# Patient Record
Sex: FEMALE | Race: WHITE | NOT HISPANIC OR LATINO | Employment: OTHER | URBAN - METROPOLITAN AREA
[De-identification: names, ages, dates, MRNs, and addresses within clinical notes are randomized per-mention and may not be internally consistent; named-entity substitution may affect disease eponyms.]

---

## 2018-09-21 ENCOUNTER — RECORDS - HEALTHEAST (OUTPATIENT)
Dept: LAB | Facility: CLINIC | Age: 83
End: 2018-09-21

## 2018-09-24 LAB
BACTERIA SPEC CULT: NO GROWTH
BACTERIA SPEC CULT: NORMAL
GRAM STAIN RESULT: NORMAL
GRAM STAIN RESULT: NORMAL

## 2021-05-26 ENCOUNTER — RECORDS - HEALTHEAST (OUTPATIENT)
Dept: ADMINISTRATIVE | Facility: CLINIC | Age: 86
End: 2021-05-26

## 2021-07-19 ENCOUNTER — TRANSCRIBE ORDERS (OUTPATIENT)
Dept: VASCULAR SURGERY | Facility: CLINIC | Age: 86
End: 2021-07-19

## 2021-07-19 DIAGNOSIS — R60.0 BILATERAL LEG EDEMA: Primary | ICD-10-CM

## 2021-07-19 DIAGNOSIS — I83.93 VARICOSE VEINS OF BOTH LOWER EXTREMITIES: ICD-10-CM

## 2021-09-15 ENCOUNTER — ANCILLARY PROCEDURE (OUTPATIENT)
Dept: VASCULAR ULTRASOUND | Facility: CLINIC | Age: 86
End: 2021-09-15
Attending: SPECIALIST
Payer: COMMERCIAL

## 2021-09-15 ENCOUNTER — OFFICE VISIT (OUTPATIENT)
Dept: VASCULAR SURGERY | Facility: CLINIC | Age: 86
End: 2021-09-15
Attending: INTERNAL MEDICINE
Payer: COMMERCIAL

## 2021-09-15 VITALS — DIASTOLIC BLOOD PRESSURE: 80 MMHG | HEART RATE: 60 BPM | SYSTOLIC BLOOD PRESSURE: 132 MMHG | TEMPERATURE: 98.5 F

## 2021-09-15 DIAGNOSIS — I83.93 VARICOSE VEINS OF BOTH LOWER EXTREMITIES: ICD-10-CM

## 2021-09-15 DIAGNOSIS — R60.0 BILATERAL LEG EDEMA: ICD-10-CM

## 2021-09-15 DIAGNOSIS — I83.893 SYMPTOMATIC VARICOSE VEINS OF BOTH LOWER EXTREMITIES: Primary | ICD-10-CM

## 2021-09-15 PROBLEM — K43.0 INCISIONAL HERNIA WITH OBSTRUCTION BUT NO GANGRENE: Status: ACTIVE | Noted: 2018-01-29

## 2021-09-15 PROBLEM — I35.0 AORTIC STENOSIS: Status: ACTIVE | Noted: 2017-07-05

## 2021-09-15 PROBLEM — M19.041 PRIMARY OSTEOARTHRITIS OF BOTH HANDS: Status: ACTIVE | Noted: 2017-04-25

## 2021-09-15 PROBLEM — H16.223 KERATITIS SICCA, BILATERAL: Status: ACTIVE | Noted: 2021-04-08

## 2021-09-15 PROBLEM — N18.30 CKD (CHRONIC KIDNEY DISEASE) STAGE 3, GFR 30-59 ML/MIN (H): Status: ACTIVE | Noted: 2017-04-25

## 2021-09-15 PROBLEM — K21.9 CHRONIC GERD: Status: ACTIVE | Noted: 2021-04-08

## 2021-09-15 PROBLEM — Z79.01 ANTICOAGULATION MONITORING, INR RANGE 2-3: Status: ACTIVE | Noted: 2018-01-03

## 2021-09-15 PROBLEM — G31.84 MCI (MILD COGNITIVE IMPAIRMENT): Status: ACTIVE | Noted: 2021-09-01

## 2021-09-15 PROBLEM — Z71.89 ACP (ADVANCE CARE PLANNING): Status: ACTIVE | Noted: 2017-07-13

## 2021-09-15 PROBLEM — I48.20 CHRONIC ATRIAL FIBRILLATION (H): Status: ACTIVE | Noted: 2017-07-18

## 2021-09-15 PROBLEM — S22.41XD CLOSED FRACTURE OF MULTIPLE RIBS OF RIGHT SIDE WITH ROUTINE HEALING: Status: ACTIVE | Noted: 2021-09-01

## 2021-09-15 PROBLEM — R42 VERTIGO: Status: ACTIVE | Noted: 2017-07-16

## 2021-09-15 PROBLEM — K25.4 GASTRIC ULCER WITH HEMORRHAGE: Status: ACTIVE | Noted: 2017-09-15

## 2021-09-15 PROBLEM — M19.042 PRIMARY OSTEOARTHRITIS OF BOTH HANDS: Status: ACTIVE | Noted: 2017-04-25

## 2021-09-15 PROBLEM — S62.102D FRACTURE OF LEFT WRIST WITH ROUTINE HEALING: Status: ACTIVE | Noted: 2021-08-02

## 2021-09-15 PROBLEM — D62 ACUTE BLOOD LOSS ANEMIA: Status: ACTIVE | Noted: 2017-07-11

## 2021-09-15 PROBLEM — Z98.84 STATUS POST GASTRIC BYPASS FOR OBESITY: Status: ACTIVE | Noted: 2018-02-23

## 2021-09-15 PROBLEM — M17.12 OSTEOARTHRITIS OF LEFT KNEE: Status: ACTIVE | Noted: 2019-11-11
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
## 2021-09-15 PROBLEM — I27.20 PULMONARY HTN (H): Status: ACTIVE | Noted: 2017-07-05

## 2021-09-15 PROBLEM — E66.9 OBESITY: Status: ACTIVE | Noted: 2019-11-11

## 2021-09-15 PROBLEM — I87.2 VENOUS INSUFFICIENCY: Status: ACTIVE | Noted: 2017-06-26

## 2021-09-15 PROCEDURE — 93970 EXTREMITY STUDY: CPT

## 2021-09-15 PROCEDURE — 99203 OFFICE O/P NEW LOW 30 MIN: CPT | Performed by: SPECIALIST

## 2021-09-15 PROCEDURE — 93970 EXTREMITY STUDY: CPT | Mod: 26 | Performed by: SURGERY

## 2021-09-15 PROCEDURE — G0463 HOSPITAL OUTPT CLINIC VISIT: HCPCS | Mod: 25

## 2021-09-15 RX ORDER — POTASSIUM CHLORIDE 750 MG/1
10 TABLET, EXTENDED RELEASE ORAL
COMMUNITY
Start: 2021-07-12

## 2021-09-15 RX ORDER — FUROSEMIDE 20 MG
20 TABLET ORAL
COMMUNITY
Start: 2021-05-24

## 2021-09-15 RX ORDER — NYSTATIN 100000 U/G
CREAM TOPICAL
COMMUNITY
Start: 2020-11-16

## 2021-09-15 RX ORDER — CLOBETASOL PROPIONATE 0.5 MG/G
OINTMENT TOPICAL
COMMUNITY
Start: 2020-11-16 | End: 2021-09-15

## 2021-09-15 RX ORDER — FEBUXOSTAT 40 MG/1
40 TABLET, FILM COATED ORAL
COMMUNITY
Start: 2021-01-21

## 2021-09-15 RX ORDER — METOPROLOL TARTRATE 50 MG
TABLET ORAL
COMMUNITY
Start: 2021-09-03

## 2021-09-15 RX ORDER — LEVOTHYROXINE SODIUM 125 UG/1
125 TABLET ORAL
COMMUNITY
Start: 2021-07-07

## 2021-09-15 RX ORDER — TRAMADOL HYDROCHLORIDE 50 MG/1
50 TABLET ORAL
COMMUNITY
Start: 2021-08-28

## 2021-09-15 RX ORDER — WARFARIN SODIUM 2 MG/1
TABLET ORAL
COMMUNITY
Start: 2021-08-30

## 2021-09-15 RX ORDER — ACETAMINOPHEN 500 MG
1000 TABLET ORAL
COMMUNITY
Start: 2019-11-13

## 2021-09-15 NOTE — PATIENT INSTRUCTIONS
"We are prescribing some compression stockings for you. I have included different suppliers that should help you get measured and fitting to ensure proper fitting socks. You should wear these socks as much as you can. It is especially important to wear them with long periods of sitting/standing, long car rides or if you will be flying. Compression socks should get refilled every 4-6 months. They do not need to be worn at night while in bed.    If you do a lot of standing it is good to do calf raises to help keep the blood pumping. If you sit a lot at work it is good to get up periodically to walk around. Elevation of the foot of your bed 4-6\" helps the blood return back to where it is needed.    We would like you to follow up in 3 months after wearing socks to see how you are doing.    Varicose Veins  Varicose veins are swollen, enlarged veins most often found in the legs. They are usually blue or purple in color and may bulge, twist, and stand out under the skin.  Normally, veins return blood from the body to the heart. The leg veins have one-way valves that prevent blood from flowing backward in the vein. When the valves are weak or damaged, blood backs up in the veins. This may cause some of the veins to swell and bulge and become varicose veins.    Symptoms  Varicose veins may or may not cause symptoms. If symptoms do occur, they can include:    Legs that feel tired, achy, heavy, or itchy    Leg muscle cramps    Skin changes, such as discoloration, dryness, redness, or rash (in more severe cases, you may also have sores on the skin called venous leg ulcers)    Risk Factors  There are a number of factors that increase the risk for varicose veins. These can include:    Being a woman    Being older    Sitting or standing for long periods    Being overweight    Being pregnant    Having a family history of varicose veins  Treatment begins with simple self-help measures (see below). If these don't help, there are many " procedures that can be done to shrink or remove varicose veins. Your healthcare provider can tell you more about these options, if needed.    Home care    Support or compression stockings will likely be prescribed. If so, be sure to wear them as directed. They may help improve blood flow.    Exercising helps strengthen your leg muscles and improve blood flow. To get the most benefit, choose exercises such as walking, swimming, or cycling. Also try to exercise for at least 30 minutes on most days.    Raising (elevating) your legs lets gravity help blood flow back to the heart. Sit or lie with your feet above heart level a few times throughout the day, or as directed.    Avoid long periods of sitting or standing. Change positions often. Also, move your ankles, toes and knees often. This may also help improve blood flow.    If you are overweight, talk with your healthcare provider about setting up a weight-loss plan. Maintaining a healthy weight can help reduce the strain on your veins. It may also improve symptoms, such as swelling and aching.    If you have dryness and itching, ask your provider about special lotions that can be applied to the skin to help improve symptoms.    Follow-up care  Follow up with your healthcare provider, or as directed. If imaging tests were done, you'll be told the results and if there are any new findings that affect your care.    When to seek medical advice  Call your healthcare provider right away if any of these occur:    Sudden, severe leg swelling, pain, or redness    Symptoms worsen, or they don't improve with self-care    Bleeding from any affected veins    Ulcers form on the legs, ankles, or feet    Fever of 100.4 F (38 C) or higher, or as advised by your provider    Understanding Spider and Varicose Veins  Do you often hide your legs because of the way they look? You may have noticed tiny red or blue bursts (spider veins). Or maybe you have veins that bulge or look twisted  (varicose veins). If so, there are treatments that can help    What are the symptoms?  Spider veins or varicose veins may never be a problem. But sometimes they can cause legs to ache or swell. Your legs may also feel heavy and tired, or like they're burning. These symptoms may be more severe at the end of the day. Prolonged sitting or standing can also make your symptoms worse.    Who gets spider and varicose veins?  Anyone can get spider or varicose veins. But vein problems tend to be hereditary (run in families). Other factors that can affect veins include:    Pregnancy, hormones, and birth control pills    A job where you stand or sit a lot    Extra weight or lack of exercise    Age    What can be done?  Spider and varicose veins can affect the way you feel about yourself. Talk to your healthcare provider about your concerns. There are treatments that can ease symptoms and make your legs look better.    Your treatment choices  Treatment may include self-care, sclerotherapy (injecting veins with a chemical), surgery, or newer nonsurgical minimally invasive therapies. Spider veins and some varicose veins can be treated with sclerotherapy. Large varicose veins can often be treated with newer minimally invasive procedures and, in rare cases, surgery may be needed.     [facility] Locations:   Business Hours:  Monday - Friday 8am - 4:30pm  Visit [email or web address]     [facility]  [address], [address]  [place] [address]  [phone]  Monday - Friday, 8 a.m. to 4:30 p.m.     [facility]  [address], [address]  [place] [address]  [phone]  Monday - Friday, 8 a.m. to 4:30 p.m.        [facility]  [address] [place] [address], [address]  [place] [address]  [phone]  Monday - Friday, 8 a.m. to 4:30 p.m.     [facility]  [address]  [place] [address]  [phone]  Monday - Friday, 8 a.m. to 4:30 p.m.     [facility]  [address], [address]  [place] [address]  [phone]  Monday - Friday, 8 a.m. to 4:30 p.m.     [facility]  [address], [address]  [place] [address]  [phone]  Monday - Friday, 8 a.m. to 4:30 p.m.  *Closed daily Noon to 1 p.m. for patient deliveries     [facility]  [facility]  [address], [address]     [place] [address]            [phone]  Monday - Friday, 8 a.m. to 5:00 p.m.

## 2021-09-17 NOTE — PROGRESS NOTES
[facility] Vein Consult      Assessment:     1. varicose veins and spider veins, bilateral   2. leg pain and swelling , bilateral   3. Insuffiencey of bilateral greater saphenous vein,  right acessory saphenous vein    Plan:     1. Treatment options of conservative therapy of stockings use, exercise, weight loss, elevating legs when possible.    2. Script for compression stockings 20-30 mm hg  3. Ultrasound to evaluate legs for incompetency of both deep and superficial system .   4. Surgical treatment, discussed briefly today.  5. Follow up: 3 months.   6. Call for any questions concerns or issues    Subjective:      Jenn Martinez is a 86 year old female  who was referred by Doe Zarate  for evaluation of varicose veins. Symptoms include pain, aching, fatigue, burning, edema and dermatitis. Patient has history of leg selling, pain and vein issues that have progressed. Pain and symptoms have affected daily living and work activities needing medications. Here for evaluation today. no stocking or compression devic use    Allergies:Augmentin, Aspirin, Allopurinol, and Atorvastatin    History reviewed. No pertinent past medical history.    History reviewed. No pertinent surgical history.      Current Outpatient Medications:      acetaminophen (TYLENOL) 500 MG tablet, Take 1,000 mg by mouth, Disp: , Rfl:      febuxostat (ULORIC) 40 MG TABS tablet, Take 40 mg by mouth, Disp: , Rfl:      furosemide (LASIX) 20 MG tablet, Take 20 mg by mouth, Disp: , Rfl:      levothyroxine (SYNTHROID/LEVOTHROID) 125 MCG tablet, Take 125 mcg by mouth, Disp: , Rfl:      metoprolol tartrate (LOPRESSOR) 50 MG tablet, TAKE 1 TABLET BY MOUTH TWICE A DAY, Disp: , Rfl:      nystatin (MYCOSTATIN) 594313 UNIT/GM external cream, , Disp: , Rfl:      potassium chloride ER (K-TAB/KLOR-CON) 10 MEQ CR tablet, Take 10 mEq by mouth, Disp: , Rfl:      traMADol (ULTRAM) 50 MG tablet, Take 50 mg by mouth, Disp: , Rfl:      vitamin B-12  (CYANOCOBALAMIN) 1000 MCG tablet, Take 1,000 mcg by mouth, Disp: , Rfl:      warfarin ANTICOAGULANT (COUMADIN) 2 MG tablet, Take by mouth 2 mg (2 mg x 1) every day. Or as directed, Disp: , Rfl:      History reviewed. No pertinent family history.           Review of Systems:  Pertinent items are noted in HPI.   Patient has symptomatic veins and changes of bilateral legs. These have progressed to the point of causing symptoms on a daily basis. This causes issues with daily activities and chores such as washing dishes, vacuuming, outdoor upkeep and standing for long lengths of time       Objective:     Vitals:    09/15/21 1029   BP: 132/80   Pulse: 60   Temp: 98.5  F (36.9  C)     There is no height or weight on file to calculate BMI.    EXAM:  GENERAL: This is a well-developed 86 year old female who appears her stated age  HEAD: normocephalic  HEENT: Pupils equal and reactive bilaterally  MOUTH: mucus membranes intact. Normal dentation  CARDIAC: RRR without murmur  CHEST/LUNG:  Clear to auscultation bilaterally  ABDOMEN: Soft, nontender, nondistended, no masses noted   NEUROLOGIC: Focally intact, nonfocal, alert and oriented x 3  INTEGUMENT: No open lesions or ulcers  VASCULAR: Pulses intact, symmetrical upper and lower extremities. There areskin changes consistent with chronic venous insufficiency. Varicose veins present in bilateral greater saphenous distribution. Spider veins present bilateral.                        Imaging:    Narrative & Impression    BILATERAL Venous Insufficiency Ultrasound (Date: 09/15/21)    BILATERAL Lower Extremity          Indication:  Bilateral leg pain/swelling/varicose veins/edema     Previous: None     Patient History: Swelling, Stasis and Morbid Obesity     Presenting Symptoms:  Swelling, Varicose Veins, Pain and Stasis     Technique:   Supine and Upright Ultrasound of the Deep and Superficial Veins with Valsalva and Compression Augmentation Maneuvers. Duplex Imaging is performed  utilizing gray-scale, Two-dimensional images, color-flow imaging, Doppler waveform analysis, and Spectral doppler imaging done with provacative maneuvers.      Incompetency Criteria:  Deep vein reflux reported when greater than 1000 msec flow reversal. Superficial vein reflux reported when equal to or greater than 600 msec flow reversal.  vein reflux reported as greater than 350 msec flow reversal.      Right  Leg Deep Veins    CFV SFJ PFV PROX FV   PROX FV MID FV DIST POP V. PERON.   V. PTV'S   Compressibility  (FC,PC,NC) FC FC FC FC FC FC FC FC FC   Reflux +   + - - - -   -         Right Leg Saphenous Veins  Location SFJ PROX THIGH KNEE MID CALF SPJ PROX CALF MID CALF   RT GSV (mm) 10 9 7 6         Reflux + + + +         RT SSV (mm)         6 5 5   Reflux         - - -      Location SFJ PROX THIGH MID THIGH   RT AASV (mm) 7 6 6   Reflux + + +      Left Leg Deep Veins    CFV SFJ PFV PROX SFV PROX SFV MID SFV DIST POP V. PERON. V. PTV'S   Compressibility  (FC,PC,NC) FC FC FC FC FC FC FC FC FC   Reflux +   - - - - +   -         Lt Leg Saphenous Veins   Location SFJ PROX THIGH KNEE MID CALF SPJ PROX CALF MID CALF   LT GSV (mm) 10 8 6 5         Reflux + + + +         LT SSV (mm)         6 5 5   Reflux         - - -      Impression:          Reference:     Compressibility: FC= Fully compressible, PC= Partially compressible, NC= Non-compressible, NV= Not Visualized     Reflux: (+) Incompetent  (-) Competent, (NV) = Not Visualized     Interpretation criteria:          Duration of Retrograde flow (milliseconds)  Category Deep Veins Superficial Veins  veins   Competent < 1000ms < 600ms < 350ms   Incompetent > 1000ms > 600ms > 350ms                Ovi Rodriguez MD  General Surgery [phone]  Vascular Surgery [phone]

## 2022-04-26 ENCOUNTER — LAB REQUISITION (OUTPATIENT)
Dept: LAB | Facility: CLINIC | Age: 87
End: 2022-04-26
Payer: MEDICARE

## 2022-04-26 DIAGNOSIS — G30.9 ALZHEIMER'S DISEASE, UNSPECIFIED (CODE) (H): ICD-10-CM

## 2022-04-26 DIAGNOSIS — I10 ESSENTIAL (PRIMARY) HYPERTENSION: ICD-10-CM

## 2022-04-26 DIAGNOSIS — E03.9 HYPOTHYROIDISM, UNSPECIFIED: ICD-10-CM

## 2022-04-27 LAB
ANION GAP SERPL CALCULATED.3IONS-SCNC: 8 MMOL/L (ref 5–18)
BASOPHILS # BLD AUTO: 0.1 10E3/UL (ref 0–0.2)
BASOPHILS NFR BLD AUTO: 1 %
BUN SERPL-MCNC: 4 MG/DL (ref 8–28)
CALCIUM SERPL-MCNC: 9.5 MG/DL (ref 8.5–10.5)
CHLORIDE BLD-SCNC: 106 MMOL/L (ref 98–107)
CO2 SERPL-SCNC: 28 MMOL/L (ref 22–31)
CREAT SERPL-MCNC: 0.71 MG/DL (ref 0.6–1.1)
EOSINOPHIL # BLD AUTO: 0.3 10E3/UL (ref 0–0.7)
EOSINOPHIL NFR BLD AUTO: 5 %
ERYTHROCYTE [DISTWIDTH] IN BLOOD BY AUTOMATED COUNT: 20.1 % (ref 10–15)
GFR SERPL CREATININE-BSD FRML MDRD: 82 ML/MIN/1.73M2
GLUCOSE BLD-MCNC: 83 MG/DL (ref 70–125)
HCT VFR BLD AUTO: 39 % (ref 35–47)
HGB BLD-MCNC: 12.7 G/DL (ref 11.7–15.7)
IMM GRANULOCYTES # BLD: 0 10E3/UL
IMM GRANULOCYTES NFR BLD: 1 %
LYMPHOCYTES # BLD AUTO: 1.2 10E3/UL (ref 0.8–5.3)
LYMPHOCYTES NFR BLD AUTO: 18 %
MCH RBC QN AUTO: 31.5 PG (ref 26.5–33)
MCHC RBC AUTO-ENTMCNC: 32.6 G/DL (ref 31.5–36.5)
MCV RBC AUTO: 97 FL (ref 78–100)
MONOCYTES # BLD AUTO: 1.1 10E3/UL (ref 0–1.3)
MONOCYTES NFR BLD AUTO: 17 %
NEUTROPHILS # BLD AUTO: 3.9 10E3/UL (ref 1.6–8.3)
NEUTROPHILS NFR BLD AUTO: 58 %
NRBC # BLD AUTO: 0 10E3/UL
NRBC BLD AUTO-RTO: 0 /100
PLATELET # BLD AUTO: 330 10E3/UL (ref 150–450)
POTASSIUM BLD-SCNC: 3.6 MMOL/L (ref 3.5–5)
RBC # BLD AUTO: 4.03 10E6/UL (ref 3.8–5.2)
SODIUM SERPL-SCNC: 142 MMOL/L (ref 136–145)
TSH SERPL DL<=0.005 MIU/L-ACNC: 0.32 UIU/ML (ref 0.3–5)
VIT B12 SERPL-MCNC: 1786 PG/ML (ref 213–816)
WBC # BLD AUTO: 6.7 10E3/UL (ref 4–11)

## 2022-04-27 PROCEDURE — 84443 ASSAY THYROID STIM HORMONE: CPT | Mod: ORL | Performed by: FAMILY MEDICINE

## 2022-04-27 PROCEDURE — 85025 COMPLETE CBC W/AUTO DIFF WBC: CPT | Mod: ORL | Performed by: FAMILY MEDICINE

## 2022-04-27 PROCEDURE — 36415 COLL VENOUS BLD VENIPUNCTURE: CPT | Mod: ORL | Performed by: FAMILY MEDICINE

## 2022-04-27 PROCEDURE — P9603 ONE-WAY ALLOW PRORATED MILES: HCPCS | Mod: ORL | Performed by: FAMILY MEDICINE

## 2022-04-27 PROCEDURE — 80048 BASIC METABOLIC PNL TOTAL CA: CPT | Mod: ORL | Performed by: FAMILY MEDICINE

## 2022-04-27 PROCEDURE — 82607 VITAMIN B-12: CPT | Mod: ORL | Performed by: FAMILY MEDICINE

## 2022-10-19 ENCOUNTER — LAB REQUISITION (OUTPATIENT)
Dept: LAB | Facility: CLINIC | Age: 87
End: 2022-10-19
Payer: COMMERCIAL

## 2022-10-19 DIAGNOSIS — E03.9 HYPOTHYROIDISM, UNSPECIFIED: ICD-10-CM

## 2022-10-21 ENCOUNTER — LAB REQUISITION (OUTPATIENT)
Dept: LAB | Facility: CLINIC | Age: 87
End: 2022-10-21
Payer: COMMERCIAL

## 2022-10-25 LAB
ANION GAP SERPL CALCULATED.3IONS-SCNC: 11 MMOL/L (ref 7–15)
BUN SERPL-MCNC: 10.9 MG/DL (ref 8–23)
CALCIUM SERPL-MCNC: 9.7 MG/DL (ref 8.8–10.2)
CHLORIDE SERPL-SCNC: 104 MMOL/L (ref 98–107)
CREAT SERPL-MCNC: 0.87 MG/DL (ref 0.51–0.95)
DEPRECATED HCO3 PLAS-SCNC: 28 MMOL/L (ref 22–29)
GFR SERPL CREATININE-BSD FRML MDRD: 64 ML/MIN/1.73M2
GLUCOSE SERPL-MCNC: 99 MG/DL (ref 70–99)
POTASSIUM SERPL-SCNC: 3.5 MMOL/L (ref 3.4–5.3)
SODIUM SERPL-SCNC: 143 MMOL/L (ref 136–145)
TSH SERPL DL<=0.005 MIU/L-ACNC: 3.95 UIU/ML (ref 0.3–4.2)

## 2022-10-25 PROCEDURE — 36415 COLL VENOUS BLD VENIPUNCTURE: CPT | Performed by: INTERNAL MEDICINE

## 2022-10-25 PROCEDURE — 80048 BASIC METABOLIC PNL TOTAL CA: CPT | Performed by: INTERNAL MEDICINE

## 2022-10-25 PROCEDURE — P9604 ONE-WAY ALLOW PRORATED TRIP: HCPCS | Performed by: INTERNAL MEDICINE

## 2022-10-25 PROCEDURE — 84443 ASSAY THYROID STIM HORMONE: CPT | Mod: ORL | Performed by: INTERNAL MEDICINE

## 2023-01-05 ENCOUNTER — LAB REQUISITION (OUTPATIENT)
Dept: LAB | Facility: CLINIC | Age: 88
End: 2023-01-05
Payer: COMMERCIAL

## 2023-01-05 DIAGNOSIS — R30.0 DYSURIA: ICD-10-CM

## 2023-01-05 LAB
ALBUMIN UR-MCNC: NEGATIVE MG/DL
APPEARANCE UR: ABNORMAL
BILIRUB UR QL STRIP: NEGATIVE
COLOR UR AUTO: YELLOW
GLUCOSE UR STRIP-MCNC: NEGATIVE MG/DL
HGB UR QL STRIP: NEGATIVE
KETONES UR STRIP-MCNC: NEGATIVE MG/DL
LEUKOCYTE ESTERASE UR QL STRIP: ABNORMAL
NITRATE UR QL: NEGATIVE
PH UR STRIP: 6 [PH] (ref 5–7)
RBC URINE: 1 /HPF
SP GR UR STRIP: 1.01 (ref 1–1.03)
SQUAMOUS EPITHELIAL: 2 /HPF
TRANSITIONAL EPI: <1 /HPF
UROBILINOGEN UR STRIP-MCNC: NORMAL MG/DL
WBC URINE: 11 /HPF

## 2023-01-05 PROCEDURE — 81001 URINALYSIS AUTO W/SCOPE: CPT | Mod: ORL | Performed by: INTERNAL MEDICINE

## 2023-01-05 PROCEDURE — 87086 URINE CULTURE/COLONY COUNT: CPT | Mod: ORL | Performed by: INTERNAL MEDICINE

## 2023-01-07 LAB — BACTERIA UR CULT: NORMAL

## 2023-02-27 ENCOUNTER — LAB REQUISITION (OUTPATIENT)
Dept: LAB | Facility: CLINIC | Age: 88
End: 2023-02-27
Payer: OTHER MISCELLANEOUS

## 2023-02-27 DIAGNOSIS — F03.90 UNSPECIFIED DEMENTIA, UNSPECIFIED SEVERITY, WITHOUT BEHAVIORAL DISTURBANCE, PSYCHOTIC DISTURBANCE, MOOD DISTURBANCE, AND ANXIETY (H): ICD-10-CM

## 2023-02-28 LAB
ALBUMIN SERPL BCG-MCNC: 3.7 G/DL (ref 3.5–5.2)
ALP SERPL-CCNC: 108 U/L (ref 35–104)
ALT SERPL W P-5'-P-CCNC: 8 U/L (ref 10–35)
ANION GAP SERPL CALCULATED.3IONS-SCNC: 11 MMOL/L (ref 7–15)
AST SERPL W P-5'-P-CCNC: 31 U/L (ref 10–35)
BILIRUB SERPL-MCNC: 0.5 MG/DL
BUN SERPL-MCNC: 17 MG/DL (ref 8–23)
CALCIUM SERPL-MCNC: 10.3 MG/DL (ref 8.8–10.2)
CHLORIDE SERPL-SCNC: 107 MMOL/L (ref 98–107)
CREAT SERPL-MCNC: 0.97 MG/DL (ref 0.51–0.95)
DEPRECATED HCO3 PLAS-SCNC: 26 MMOL/L (ref 22–29)
ERYTHROCYTE [DISTWIDTH] IN BLOOD BY AUTOMATED COUNT: 14.8 % (ref 10–15)
GFR SERPL CREATININE-BSD FRML MDRD: 56 ML/MIN/1.73M2
GLUCOSE SERPL-MCNC: 54 MG/DL (ref 70–99)
HCT VFR BLD AUTO: 40.8 % (ref 35–47)
HGB BLD-MCNC: 12.5 G/DL (ref 11.7–15.7)
MCH RBC QN AUTO: 31.5 PG (ref 26.5–33)
MCHC RBC AUTO-ENTMCNC: 30.6 G/DL (ref 31.5–36.5)
MCV RBC AUTO: 103 FL (ref 78–100)
PLATELET # BLD AUTO: 351 10E3/UL (ref 150–450)
POTASSIUM SERPL-SCNC: 4.2 MMOL/L (ref 3.4–5.3)
PROT SERPL-MCNC: 6.8 G/DL (ref 6.4–8.3)
RBC # BLD AUTO: 3.97 10E6/UL (ref 3.8–5.2)
SODIUM SERPL-SCNC: 144 MMOL/L (ref 136–145)
TSH SERPL DL<=0.005 MIU/L-ACNC: 2.18 UIU/ML (ref 0.3–4.2)
VIT B12 SERPL-MCNC: 288 PG/ML (ref 232–1245)
WBC # BLD AUTO: 7.1 10E3/UL (ref 4–11)

## 2023-02-28 PROCEDURE — 84443 ASSAY THYROID STIM HORMONE: CPT | Mod: ORL | Performed by: INTERNAL MEDICINE

## 2023-02-28 PROCEDURE — 82607 VITAMIN B-12: CPT | Mod: ORL | Performed by: INTERNAL MEDICINE

## 2023-02-28 PROCEDURE — 36415 COLL VENOUS BLD VENIPUNCTURE: CPT | Mod: ORL | Performed by: INTERNAL MEDICINE

## 2023-02-28 PROCEDURE — 85027 COMPLETE CBC AUTOMATED: CPT | Mod: ORL | Performed by: INTERNAL MEDICINE

## 2023-02-28 PROCEDURE — 80053 COMPREHEN METABOLIC PANEL: CPT | Mod: ORL | Performed by: INTERNAL MEDICINE

## 2023-02-28 PROCEDURE — P9603 ONE-WAY ALLOW PRORATED MILES: HCPCS | Mod: ORL | Performed by: INTERNAL MEDICINE

## 2023-06-12 ENCOUNTER — LAB REQUISITION (OUTPATIENT)
Dept: LAB | Facility: CLINIC | Age: 88
End: 2023-06-12
Payer: COMMERCIAL

## 2023-06-12 DIAGNOSIS — I50.9 HEART FAILURE, UNSPECIFIED (H): ICD-10-CM

## 2023-06-13 LAB
ANION GAP SERPL CALCULATED.3IONS-SCNC: 10 MMOL/L (ref 7–15)
BUN SERPL-MCNC: 18.5 MG/DL (ref 8–23)
CALCIUM SERPL-MCNC: 10 MG/DL (ref 8.8–10.2)
CHLORIDE SERPL-SCNC: 105 MMOL/L (ref 98–107)
CREAT SERPL-MCNC: 0.97 MG/DL (ref 0.51–0.95)
DEPRECATED HCO3 PLAS-SCNC: 27 MMOL/L (ref 22–29)
GFR SERPL CREATININE-BSD FRML MDRD: 56 ML/MIN/1.73M2
GLUCOSE SERPL-MCNC: 64 MG/DL (ref 70–99)
POTASSIUM SERPL-SCNC: 3.9 MMOL/L (ref 3.4–5.3)
SODIUM SERPL-SCNC: 142 MMOL/L (ref 136–145)

## 2023-06-13 PROCEDURE — 36415 COLL VENOUS BLD VENIPUNCTURE: CPT | Mod: ORL | Performed by: INTERNAL MEDICINE

## 2023-06-13 PROCEDURE — 80048 BASIC METABOLIC PNL TOTAL CA: CPT | Mod: ORL | Performed by: INTERNAL MEDICINE

## 2023-06-13 PROCEDURE — P9603 ONE-WAY ALLOW PRORATED MILES: HCPCS | Mod: ORL | Performed by: INTERNAL MEDICINE

## 2023-07-24 ENCOUNTER — LAB REQUISITION (OUTPATIENT)
Dept: LAB | Facility: CLINIC | Age: 88
End: 2023-07-24
Payer: COMMERCIAL

## 2023-07-24 DIAGNOSIS — I10 ESSENTIAL (PRIMARY) HYPERTENSION: ICD-10-CM

## 2023-07-25 LAB
ANION GAP SERPL CALCULATED.3IONS-SCNC: 12 MMOL/L (ref 7–15)
BUN SERPL-MCNC: 26.4 MG/DL (ref 8–23)
CALCIUM SERPL-MCNC: 10 MG/DL (ref 8.8–10.2)
CHLORIDE SERPL-SCNC: 101 MMOL/L (ref 98–107)
CREAT SERPL-MCNC: 0.98 MG/DL (ref 0.51–0.95)
DEPRECATED HCO3 PLAS-SCNC: 28 MMOL/L (ref 22–29)
GFR SERPL CREATININE-BSD FRML MDRD: 55 ML/MIN/1.73M2
GLUCOSE SERPL-MCNC: 82 MG/DL (ref 70–99)
POTASSIUM SERPL-SCNC: 3.6 MMOL/L (ref 3.4–5.3)
SODIUM SERPL-SCNC: 141 MMOL/L (ref 136–145)

## 2023-07-25 PROCEDURE — P9604 ONE-WAY ALLOW PRORATED TRIP: HCPCS | Mod: ORL | Performed by: INTERNAL MEDICINE

## 2023-07-25 PROCEDURE — 36415 COLL VENOUS BLD VENIPUNCTURE: CPT | Mod: ORL | Performed by: INTERNAL MEDICINE

## 2023-07-25 PROCEDURE — 80048 BASIC METABOLIC PNL TOTAL CA: CPT | Mod: ORL | Performed by: INTERNAL MEDICINE

## 2024-01-22 ENCOUNTER — LAB REQUISITION (OUTPATIENT)
Dept: LAB | Facility: CLINIC | Age: 89
End: 2024-01-22
Payer: COMMERCIAL

## 2024-01-22 DIAGNOSIS — F03.90 UNSPECIFIED DEMENTIA, UNSPECIFIED SEVERITY, WITHOUT BEHAVIORAL DISTURBANCE, PSYCHOTIC DISTURBANCE, MOOD DISTURBANCE, AND ANXIETY (H): ICD-10-CM

## 2024-01-23 LAB
ERYTHROCYTE [DISTWIDTH] IN BLOOD BY AUTOMATED COUNT: 15.8 % (ref 10–15)
HCT VFR BLD AUTO: 41.9 % (ref 35–47)
HGB BLD-MCNC: 13.4 G/DL (ref 11.7–15.7)
MCH RBC QN AUTO: 31.5 PG (ref 26.5–33)
MCHC RBC AUTO-ENTMCNC: 32 G/DL (ref 31.5–36.5)
MCV RBC AUTO: 98 FL (ref 78–100)
PLATELET # BLD AUTO: 345 10E3/UL (ref 150–450)
RBC # BLD AUTO: 4.26 10E6/UL (ref 3.8–5.2)
WBC # BLD AUTO: 6.3 10E3/UL (ref 4–11)

## 2024-01-23 PROCEDURE — P9603 ONE-WAY ALLOW PRORATED MILES: HCPCS | Mod: ORL | Performed by: INTERNAL MEDICINE

## 2024-01-23 PROCEDURE — 80053 COMPREHEN METABOLIC PANEL: CPT | Mod: ORL | Performed by: INTERNAL MEDICINE

## 2024-01-23 PROCEDURE — 36415 COLL VENOUS BLD VENIPUNCTURE: CPT | Mod: ORL | Performed by: INTERNAL MEDICINE

## 2024-01-23 PROCEDURE — 85027 COMPLETE CBC AUTOMATED: CPT | Mod: ORL | Performed by: INTERNAL MEDICINE

## 2024-01-23 PROCEDURE — 84443 ASSAY THYROID STIM HORMONE: CPT | Mod: ORL | Performed by: INTERNAL MEDICINE

## 2024-01-23 PROCEDURE — 82607 VITAMIN B-12: CPT | Mod: ORL | Performed by: INTERNAL MEDICINE

## 2024-01-24 LAB
ALBUMIN SERPL BCG-MCNC: 4 G/DL (ref 3.5–5.2)
ALP SERPL-CCNC: 98 U/L (ref 40–150)
ALT SERPL W P-5'-P-CCNC: 9 U/L (ref 0–50)
ANION GAP SERPL CALCULATED.3IONS-SCNC: 12 MMOL/L (ref 7–15)
AST SERPL W P-5'-P-CCNC: 28 U/L (ref 0–45)
BILIRUB SERPL-MCNC: 0.5 MG/DL
BUN SERPL-MCNC: 24.6 MG/DL (ref 8–23)
CALCIUM SERPL-MCNC: 9.8 MG/DL (ref 8.8–10.2)
CHLORIDE SERPL-SCNC: 103 MMOL/L (ref 98–107)
CREAT SERPL-MCNC: 1.05 MG/DL (ref 0.51–0.95)
DEPRECATED HCO3 PLAS-SCNC: 24 MMOL/L (ref 22–29)
EGFRCR SERPLBLD CKD-EPI 2021: 51 ML/MIN/1.73M2
GLUCOSE SERPL-MCNC: 91 MG/DL (ref 70–99)
POTASSIUM SERPL-SCNC: 4.7 MMOL/L (ref 3.4–5.3)
PROT SERPL-MCNC: 7.2 G/DL (ref 6.4–8.3)
SODIUM SERPL-SCNC: 139 MMOL/L (ref 135–145)
TSH SERPL DL<=0.005 MIU/L-ACNC: 6.51 UIU/ML (ref 0.3–4.2)
VIT B12 SERPL-MCNC: 272 PG/ML (ref 232–1245)

## 2024-03-25 ENCOUNTER — APPOINTMENT (OUTPATIENT)
Dept: RADIOLOGY | Facility: HOSPITAL | Age: 89
End: 2024-03-25
Attending: EMERGENCY MEDICINE
Payer: COMMERCIAL

## 2024-03-25 ENCOUNTER — APPOINTMENT (OUTPATIENT)
Dept: CT IMAGING | Facility: HOSPITAL | Age: 89
End: 2024-03-25
Attending: EMERGENCY MEDICINE
Payer: COMMERCIAL

## 2024-03-25 ENCOUNTER — HOSPITAL ENCOUNTER (EMERGENCY)
Facility: HOSPITAL | Age: 89
Discharge: HOME | End: 2024-03-26
Attending: EMERGENCY MEDICINE | Admitting: EMERGENCY MEDICINE
Payer: COMMERCIAL

## 2024-03-25 DIAGNOSIS — S22.080A T12 COMPRESSION FRACTURE, INITIAL ENCOUNTER (H): ICD-10-CM

## 2024-03-25 DIAGNOSIS — S00.412A ABRASION OF LEFT EAR, INITIAL ENCOUNTER: ICD-10-CM

## 2024-03-25 DIAGNOSIS — I48.20 CHRONIC ATRIAL FIBRILLATION (H): ICD-10-CM

## 2024-03-25 DIAGNOSIS — S09.90XA CLOSED HEAD INJURY, INITIAL ENCOUNTER: ICD-10-CM

## 2024-03-25 DIAGNOSIS — M25.562 ACUTE PAIN OF LEFT KNEE: ICD-10-CM

## 2024-03-25 LAB
ALBUMIN SERPL BCG-MCNC: 3.9 G/DL (ref 3.5–5.2)
ALBUMIN UR-MCNC: NEGATIVE MG/DL
ALP SERPL-CCNC: 129 U/L (ref 40–150)
ALT SERPL W P-5'-P-CCNC: 11 U/L (ref 0–50)
ANION GAP SERPL CALCULATED.3IONS-SCNC: 14 MMOL/L (ref 7–15)
APPEARANCE UR: CLEAR
AST SERPL W P-5'-P-CCNC: 24 U/L (ref 0–45)
BASOPHILS # BLD AUTO: 0.1 10E3/UL (ref 0–0.2)
BASOPHILS NFR BLD AUTO: 1 %
BILIRUB SERPL-MCNC: 0.3 MG/DL
BILIRUB UR QL STRIP: NEGATIVE
BUN SERPL-MCNC: 25.8 MG/DL (ref 8–23)
CALCIUM SERPL-MCNC: 10 MG/DL (ref 8.8–10.2)
CHLORIDE SERPL-SCNC: 101 MMOL/L (ref 98–107)
COLOR UR AUTO: NORMAL
CREAT SERPL-MCNC: 1.05 MG/DL (ref 0.51–0.95)
DEPRECATED HCO3 PLAS-SCNC: 24 MMOL/L (ref 22–29)
EGFRCR SERPLBLD CKD-EPI 2021: 51 ML/MIN/1.73M2
EOSINOPHIL # BLD AUTO: 0.3 10E3/UL (ref 0–0.7)
EOSINOPHIL NFR BLD AUTO: 3 %
ERYTHROCYTE [DISTWIDTH] IN BLOOD BY AUTOMATED COUNT: 15 % (ref 10–15)
GLUCOSE SERPL-MCNC: 96 MG/DL (ref 70–99)
GLUCOSE UR STRIP-MCNC: NEGATIVE MG/DL
HCT VFR BLD AUTO: 39.1 % (ref 35–47)
HGB BLD-MCNC: 12.9 G/DL (ref 11.7–15.7)
HGB UR QL STRIP: NEGATIVE
HOLD SPECIMEN: NORMAL
IMM GRANULOCYTES # BLD: 0 10E3/UL
IMM GRANULOCYTES NFR BLD: 1 %
KETONES UR STRIP-MCNC: NEGATIVE MG/DL
LEUKOCYTE ESTERASE UR QL STRIP: NEGATIVE
LYMPHOCYTES # BLD AUTO: 2.2 10E3/UL (ref 0.8–5.3)
LYMPHOCYTES NFR BLD AUTO: 27 %
MCH RBC QN AUTO: 31 PG (ref 26.5–33)
MCHC RBC AUTO-ENTMCNC: 33 G/DL (ref 31.5–36.5)
MCV RBC AUTO: 94 FL (ref 78–100)
MONOCYTES # BLD AUTO: 1.3 10E3/UL (ref 0–1.3)
MONOCYTES NFR BLD AUTO: 15 %
NEUTROPHILS # BLD AUTO: 4.5 10E3/UL (ref 1.6–8.3)
NEUTROPHILS NFR BLD AUTO: 53 %
NITRATE UR QL: NEGATIVE
NRBC # BLD AUTO: 0 10E3/UL
NRBC BLD AUTO-RTO: 0 /100
PH UR STRIP: 6.5 [PH] (ref 5–7)
PLATELET # BLD AUTO: 469 10E3/UL (ref 150–450)
POTASSIUM SERPL-SCNC: 4.4 MMOL/L (ref 3.4–5.3)
PROT SERPL-MCNC: 7.5 G/DL (ref 6.4–8.3)
RBC # BLD AUTO: 4.16 10E6/UL (ref 3.8–5.2)
RBC URINE: 1 /HPF
SODIUM SERPL-SCNC: 139 MMOL/L (ref 135–145)
SP GR UR STRIP: 1.02 (ref 1–1.03)
UROBILINOGEN UR STRIP-MCNC: <2 MG/DL
WBC # BLD AUTO: 8.4 10E3/UL (ref 4–11)
WBC URINE: 1 /HPF

## 2024-03-25 PROCEDURE — 81001 URINALYSIS AUTO W/SCOPE: CPT | Performed by: EMERGENCY MEDICINE

## 2024-03-25 PROCEDURE — 36415 COLL VENOUS BLD VENIPUNCTURE: CPT | Performed by: STUDENT IN AN ORGANIZED HEALTH CARE EDUCATION/TRAINING PROGRAM

## 2024-03-25 PROCEDURE — 99285 EMERGENCY DEPT VISIT HI MDM: CPT | Mod: 25

## 2024-03-25 PROCEDURE — 73502 X-RAY EXAM HIP UNI 2-3 VIEWS: CPT

## 2024-03-25 PROCEDURE — 80053 COMPREHEN METABOLIC PANEL: CPT | Performed by: EMERGENCY MEDICINE

## 2024-03-25 PROCEDURE — 73562 X-RAY EXAM OF KNEE 3: CPT | Mod: LT

## 2024-03-25 PROCEDURE — 70450 CT HEAD/BRAIN W/O DYE: CPT

## 2024-03-25 PROCEDURE — 72131 CT LUMBAR SPINE W/O DYE: CPT

## 2024-03-25 PROCEDURE — 85025 COMPLETE CBC W/AUTO DIFF WBC: CPT | Performed by: STUDENT IN AN ORGANIZED HEALTH CARE EDUCATION/TRAINING PROGRAM

## 2024-03-25 PROCEDURE — 250N000011 HC RX IP 250 OP 636: Performed by: EMERGENCY MEDICINE

## 2024-03-25 PROCEDURE — 93005 ELECTROCARDIOGRAM TRACING: CPT | Performed by: STUDENT IN AN ORGANIZED HEALTH CARE EDUCATION/TRAINING PROGRAM

## 2024-03-25 PROCEDURE — 96375 TX/PRO/DX INJ NEW DRUG ADDON: CPT

## 2024-03-25 PROCEDURE — 96374 THER/PROPH/DIAG INJ IV PUSH: CPT

## 2024-03-25 RX ORDER — FENTANYL CITRATE 50 UG/ML
25 INJECTION, SOLUTION INTRAMUSCULAR; INTRAVENOUS ONCE
Status: COMPLETED | OUTPATIENT
Start: 2024-03-25 | End: 2024-03-25

## 2024-03-25 RX ORDER — ONDANSETRON 2 MG/ML
4 INJECTION INTRAMUSCULAR; INTRAVENOUS ONCE
Status: COMPLETED | OUTPATIENT
Start: 2024-03-25 | End: 2024-03-25

## 2024-03-25 RX ADMIN — ONDANSETRON 4 MG: 2 INJECTION INTRAMUSCULAR; INTRAVENOUS at 21:25

## 2024-03-25 RX ADMIN — FENTANYL CITRATE 25 MCG: 50 INJECTION, SOLUTION INTRAMUSCULAR; INTRAVENOUS at 21:27

## 2024-03-25 ASSESSMENT — ACTIVITIES OF DAILY LIVING (ADL)
ADLS_ACUITY_SCORE: 35
ADLS_ACUITY_SCORE: 35
ADLS_ACUITY_SCORE: 41

## 2024-03-26 VITALS
WEIGHT: 177 LBS | RESPIRATION RATE: 29 BRPM | HEART RATE: 76 BPM | TEMPERATURE: 97.7 F | OXYGEN SATURATION: 99 % | DIASTOLIC BLOOD PRESSURE: 65 MMHG | BODY MASS INDEX: 26.22 KG/M2 | HEIGHT: 69 IN | SYSTOLIC BLOOD PRESSURE: 146 MMHG

## 2024-03-26 NOTE — ED NOTES
Chronic; pt states controlled.  Continue levothyroxine.     Pt discharged with family back to her Care Facility at 0040. All questions answered. Pt daughter expressed understanding of info

## 2024-03-26 NOTE — ED NOTES
Bed: JNED-22  Expected date:   Expected time:   Means of arrival:   Comments:  [facility] - Fall-- memory care

## 2024-03-26 NOTE — ED TRIAGE NOTES
Patient is a patient at [facility]. Patient was found down by staff at the entry of her apartment. Patient uses a walker, but it was found no where near the patient.     Patient reports low back pain, and her head hurting. Complaints have changed to left leg hurting here     BS 97  Daughter who is POA is on her way      Triage Assessment (Adult)       Row Name 03/25/24 2040          Triage Assessment    Airway WDL WDL        Respiratory WDL    Respiratory WDL WDL        Peripheral/Neurovascular WDL    Peripheral Neurovascular WDL WDL

## 2024-03-26 NOTE — ED NOTES
Successful road test. Pt was able to ambulate quite a long way in hallway with walker and SBA. Steady gait

## 2024-03-26 NOTE — DISCHARGE INSTRUCTIONS
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  No fracture or hardware malfunction of the left knee or hip.  CT head negative for acute process.  Blood work stable, no urinary tract infection.  Follow-up with outpatient primary care doctor as needed.  Ice any areas of pain.

## 2024-03-26 NOTE — ED PROVIDER NOTES
EMERGENCY DEPARTMENT ENCOUNTER      NAME: Jenn Martinez  AGE: 88 year old female  YOB: 1935  MRN: [id number]  EVALUATION DATE & TIME: 3/25/2024  8:37 PM    PCP: Doe Zarate    ED PROVIDER: Ban Johnson M.D.      Chief Complaint   Patient presents with    Fall       FINAL IMPRESSION:  1. Closed head injury, initial encounter    2. T12 compression fracture, initial encounter (H)    3. Abrasion of left ear, initial encounter    4. Chronic atrial fibrillation (H)    5. Acute pain of left knee        ED COURSE & MEDICAL DECISION MAKING:    Unwitnessed fall, uncertain mechanism.  History of Alzheimer's, cannot provide accurate history.  Appears to be in pain to left hip, left knee, minor trauma to left ear, CT head and neck, CT L-spine, x-ray left hip, left knee ordered.  Basic blood work ordered including CBC, CMP.  I also ordered a EKG and urinalysis for unwitnessed fall.  She does not know if she lost consciousness.  Small dose of fentanyl given for pain.  Discussed workup prior to ordering with power of , daughter.  Disposition pending workup.    Update: EKG shows A-fib, normal rate, history of chronic A-fib.    9:02 PM I met with the patient to gather history and to perform my initial exam. I discussed the plan for care while in the Emergency Department.  ED Course as of 03/26/24 0102   Mon Mar 25, 2024   2346 Neg ct head, l spine, xrays of left hip and knee. Will attempt ambulation then dispo home if successful.     Pertinent Labs & Imaging studies reviewed. (See chart for details).    Medical Decision Making  Obtained supplemental history:Supplemental history obtained?: Documented in chart and EMS  Reviewed external records: External records reviewed?: Documented in chart  Care impacted by chronic illness:Chronic Pain, Dementia, Heart Disease, Hypertension, and Other: Alzheimer's disease, MCI, CHF, chronic midline lower back pain, CKD, and OA  Care significantly affected by social  "determinants of health:Access to Affordable Health Care  Did you consider but not order tests?: Work up considered but not performed and documented in chart, if applicable  Did you interpret images independently?: Independent interpretation of ECG and images noted in documentation, when applicable.  Consultation discussion with other provider:Did you involve another provider (consultant, MH, pharmacy, etc.)?: No  Discharge. No recommendations on prescription strength medication(s). See documentation for any additional details.    At the conclusion of the encounter I discussed the results of all of the tests and the disposition. The questions were answered. The patient or family acknowledged understanding and was agreeable with the care plan.      CRITICAL CARE:  N/A    HPI    Patient information was obtained from: EMS and patient    Use of : N/A       Jenn Martinez is a 88 year old female with PMHx of Alzheimer's disease, who presents with fall.    Per EMS, patient currently resides at a memory care facility. Patient was found down on the ground far away from her walker. Patient notes low back pain, head pain, and c/o of left hip/leg pain. She has a injury/cut to her left ear.    Patient states she fell flat into the ground at her living room floor and isn't sure if she felt dizzy or lightheaded prior to fall. Patient isn't sure if she lost consciousness. She doesn't know or recall how she fell and how she ended up far away from her walker at the memory care facility. She notes pain from her left mid-thigh to the left mid-calf and complains of pain with slight touch or any movement of the left lower extremity. She doesn't complain of left ankle pain. When asked if she had neck pain, patient responded by stating \"I don't know\". Endorses lower lumbar back pain. No other reported complaints or concerns at this time.      REVIEW OF SYSTEMS  All other systems negative unless noted in HPI.    PAST MEDICAL " "HISTORY:  No past medical history on file.    PAST SURGICAL HISTORY:  No past surgical history on file.      CURRENT MEDICATIONS:    No current facility-administered medications for this encounter.     Current Outpatient Medications   Medication    acetaminophen (TYLENOL) 500 MG tablet    febuxostat (ULORIC) 40 MG TABS tablet    furosemide (LASIX) 20 MG tablet    levothyroxine (SYNTHROID/LEVOTHROID) 125 MCG tablet    metoprolol tartrate (LOPRESSOR) 50 MG tablet    nystatin (MYCOSTATIN) 243139 UNIT/GM external cream    potassium chloride ER (K-TAB/KLOR-CON) 10 MEQ CR tablet    traMADol (ULTRAM) 50 MG tablet    vitamin B-12 (CYANOCOBALAMIN) 1000 MCG tablet    warfarin ANTICOAGULANT (COUMADIN) 2 MG tablet         ALLERGIES:  Allergies   Allergen Reactions    Amoxicillin-Pot Clavulanate Diarrhea and GI Disturbance     Explosive diarrhea for one week  Explosive diarrhea for one week      Aspirin GI Disturbance     Other reaction(s): GI Bleeding    Allopurinol Rash    Atorvastatin Rash       FAMILY HISTORY:  No family history on file.    SOCIAL HISTORY:  Social History     Socioeconomic History    Marital status:    Tobacco Use    Smoking status: Never    Smokeless tobacco: Never       VITALS:  Patient Vitals for the past 24 hrs:   BP Temp Temp src Pulse Resp SpO2 Height Weight   03/26/24 0021 (!) 146/65 -- -- 76 -- 99 % -- --   03/25/24 2230 125/62 -- -- 102 29 100 % -- --   03/25/24 2130 134/64 -- -- 77 24 100 % -- --   03/25/24 2100 (!) 165/70 -- -- 72 22 100 % -- --   03/25/24 2045 (!) 169/72 -- -- 70 25 100 % -- --   03/25/24 2043 (!) 195/79 97.7  F (36.5  C) Oral 76 27 100 % 1.753 m (5' 9\") --   03/25/24 2041 -- -- -- -- -- -- -- 80.3 kg (177 lb)   03/25/24 2040 (!) 195/79 -- -- 70 16 99 % -- --       PHYSICAL EXAM    VITAL SIGNS: BP (!) 146/65   Pulse 76   Temp 97.7  F (36.5  C) (Oral)   Resp 29   Ht 1.753 m (5' 9\")   Wt 80.3 kg (177 lb)   SpO2 99%   BMI 26.14 kg/m    Physical Exam  Vitals and nursing " note reviewed.   Constitutional:       Appearance: She is obese.      Comments: Elderly, chronically ill-appearing   HENT:      Head: Normocephalic.      Comments: abrasion to the left auricle, soft tissue swelling to left pinna. No bleeding noted. No significant hematoma  Eyes:      General: No scleral icterus.        Right eye: No discharge.         Left eye: No discharge.      Pupils: Pupils are equal, round, and reactive to light.   Neck:      Comments: No midline c spine tenderness  Pulmonary:      Effort: Pulmonary effort is normal. No respiratory distress.      Breath sounds: No wheezing.   Abdominal:      General: There is no distension.      Palpations: Abdomen is soft.      Tenderness: There is no abdominal tenderness.   Musculoskeletal:      Cervical back: Neck supple. No rigidity.      Right lower leg: No edema.      Left lower leg: No edema.      Comments: Mild L spine tenderness to palpation, no stepoff.  No T or C.  Spine tenderness to palpation.  Pain to left knee, no significant deformity.  Pain to palpation to left knee, left anterior proximal tibia, left distal femur.  No obvious leg rotation or shortening.   Skin:     General: Skin is warm and dry.      Capillary Refill: Capillary refill takes less than 2 seconds.   Psychiatric:      Comments: Pleasantly confused         LABS  Labs Ordered and Resulted from Time of ED Arrival to Time of ED Departure   CBC WITH PLATELETS AND DIFFERENTIAL - Abnormal       Result Value    WBC Count 8.4      RBC Count 4.16      Hemoglobin 12.9      Hematocrit 39.1      MCV 94      MCH 31.0      MCHC 33.0      RDW 15.0      Platelet Count 469 (*)     % Neutrophils 53      % Lymphocytes 27      % Monocytes 15      % Eosinophils 3      % Basophils 1      % Immature Granulocytes 1      NRBCs per 100 WBC 0      Absolute Neutrophils 4.5      Absolute Lymphocytes 2.2      Absolute Monocytes 1.3      Absolute Eosinophils 0.3      Absolute Basophils 0.1      Absolute Immature  Granulocytes 0.0      Absolute NRBCs 0.0     COMPREHENSIVE METABOLIC PANEL - Abnormal    Sodium 139      Potassium 4.4      Carbon Dioxide (CO2) 24      Anion Gap 14      Urea Nitrogen 25.8 (*)     Creatinine 1.05 (*)     GFR Estimate 51 (*)     Calcium 10.0      Chloride 101      Glucose 96      Alkaline Phosphatase 129      AST 24      ALT 11      Protein Total 7.5      Albumin 3.9      Bilirubin Total 0.3     ROUTINE UA WITH MICROSCOPIC REFLEX TO CULTURE - Normal    Color Urine Light Yellow      Appearance Urine Clear      Glucose Urine Negative      Bilirubin Urine Negative      Ketones Urine Negative      Specific Gravity Urine 1.016      Blood Urine Negative      pH Urine 6.5      Protein Albumin Urine Negative      Urobilinogen Urine <2.0      Nitrite Urine Negative      Leukocyte Esterase Urine Negative      RBC Urine 1      WBC Urine 1           RADIOLOGY  XR Knee Left 3 Views   Final Result   IMPRESSION: A medial compartment bipolar hemiarthroplasty is present the hardware is in good position no evidence of loosening or failure. Mild degenerative osteoarthritic changes of the lateral and patellofemoral joint are noted.      XR Pelvis and Hip Left 2 Views   Final Result   IMPRESSION: Normal joint spaces and alignment. No fracture.      CT Lumbar Spine w/o Contrast   Final Result   IMPRESSION:   1.  Age-indeterminate, however likely subacute/chronic T12 burst fracture with greater than 75% vertebral body height loss anteriorly and 4 mm retropulsion. No high-grade canal stenosis at this level.   2.  Moderate L4-L5 canal stenosis. Severe left L5-S1 foraminal stenosis.   3.  Nonobstructing right intrarenal calculi.   4.  Nonspecific small calcified retroperitoneal lymph nodes. Correlate for prior infectious/malignant process.      Head CT w/o contrast   Final Result   IMPRESSION:   1.  No CT evidence for acute intracranial process.   2.  Brain atrophy and presumed chronic microvascular ischemic changes as  above.         I have independently reviewed the above image. See radiology report for detail.      EKG:    EKG obtained at 3/25/2024 at 21:31:19 and shows atrial fibrillation. 67 BPM. Septal infarct, age undetermined. Abnormal ECG, Qtc 462 ms, compared to previous EKG on no previous ECGs available for comparison. I have independently reviewed and interpreted today's EKG, pending Cardiologist read.    See ED course notes above.       PROCEDURES:  N/A      MEDICATIONS GIVEN IN THE EMERGENCY:  Medications   fentaNYL (PF) (SUBLIMAZE) injection 25 mcg (25 mcg Intravenous $Given 3/25/24 2127)   ondansetron (ZOFRAN) injection 4 mg (4 mg Intravenous $Given 3/25/24 2125)       NEW PRESCRIPTIONS STARTED AT TODAY'S ER VISIT  Discharge Medication List as of 3/26/2024 12:33 AM           I, Harleen Gibbs, am serving as a scribe to document services personally performed by Ban Johnson MD, based on my observations and the provider's statements to me.  I, Ban Johnson MD, attest that Harleen Gibbs is acting in a scribe capacity, has observed my performance of the services and has documented them in accordance with my direction.     Ban Johnson MD  Emergency Medicine  [facility] EMERGENCY DEPARTMENT  [facility] [facility] [id number] [phone]  Dept: [phone]             Ban Johnson MD  04/02/24 9721

## 2024-03-31 LAB
ATRIAL RATE - MUSE: 42 BPM
DIASTOLIC BLOOD PRESSURE - MUSE: NORMAL MMHG
INTERPRETATION ECG - MUSE: NORMAL
P AXIS - MUSE: NORMAL DEGREES
PR INTERVAL - MUSE: NORMAL MS
QRS DURATION - MUSE: 78 MS
QT - MUSE: 438 MS
QTC - MUSE: 462 MS
R AXIS - MUSE: 54 DEGREES
SYSTOLIC BLOOD PRESSURE - MUSE: NORMAL MMHG
T AXIS - MUSE: 73 DEGREES
VENTRICULAR RATE- MUSE: 67 BPM

## 2024-04-01 ENCOUNTER — DOCUMENTATION ONLY (OUTPATIENT)
Dept: OTHER | Facility: CLINIC | Age: 89
End: 2024-04-01
Payer: COMMERCIAL

## 2024-09-28 ENCOUNTER — LAB REQUISITION (OUTPATIENT)
Dept: LAB | Facility: CLINIC | Age: 89
End: 2024-09-28
Payer: COMMERCIAL

## 2024-09-28 DIAGNOSIS — F03.90 UNSPECIFIED DEMENTIA, UNSPECIFIED SEVERITY, WITHOUT BEHAVIORAL DISTURBANCE, PSYCHOTIC DISTURBANCE, MOOD DISTURBANCE, AND ANXIETY (H): ICD-10-CM

## 2024-10-01 LAB
ALBUMIN SERPL BCG-MCNC: 4 G/DL (ref 3.5–5.2)
ALP SERPL-CCNC: 103 U/L (ref 40–150)
ALT SERPL W P-5'-P-CCNC: 10 U/L (ref 0–50)
ANION GAP SERPL CALCULATED.3IONS-SCNC: 11 MMOL/L (ref 7–15)
AST SERPL W P-5'-P-CCNC: 27 U/L (ref 0–45)
BILIRUB SERPL-MCNC: 0.4 MG/DL
BUN SERPL-MCNC: 20.6 MG/DL (ref 8–23)
CALCIUM SERPL-MCNC: 9.9 MG/DL (ref 8.8–10.4)
CHLORIDE SERPL-SCNC: 107 MMOL/L (ref 98–107)
CREAT SERPL-MCNC: 1 MG/DL (ref 0.51–0.95)
EGFRCR SERPLBLD CKD-EPI 2021: 54 ML/MIN/1.73M2
ERYTHROCYTE [DISTWIDTH] IN BLOOD BY AUTOMATED COUNT: 15.5 % (ref 10–15)
GLUCOSE SERPL-MCNC: 110 MG/DL (ref 70–99)
HCO3 SERPL-SCNC: 25 MMOL/L (ref 22–29)
HCT VFR BLD AUTO: 41.6 % (ref 35–47)
HGB BLD-MCNC: 12.8 G/DL (ref 11.7–15.7)
MCH RBC QN AUTO: 30.8 PG (ref 26.5–33)
MCHC RBC AUTO-ENTMCNC: 30.8 G/DL (ref 31.5–36.5)
MCV RBC AUTO: 100 FL (ref 78–100)
PLATELET # BLD AUTO: 366 10E3/UL (ref 150–450)
POTASSIUM SERPL-SCNC: 4.2 MMOL/L (ref 3.4–5.3)
PROT SERPL-MCNC: 7.3 G/DL (ref 6.4–8.3)
RBC # BLD AUTO: 4.15 10E6/UL (ref 3.8–5.2)
SODIUM SERPL-SCNC: 143 MMOL/L (ref 135–145)
TSH SERPL DL<=0.005 MIU/L-ACNC: 9.56 UIU/ML (ref 0.3–4.2)
VIT B12 SERPL-MCNC: 257 PG/ML (ref 232–1245)
WBC # BLD AUTO: 7.8 10E3/UL (ref 4–11)

## 2024-10-01 PROCEDURE — P9603 ONE-WAY ALLOW PRORATED MILES: HCPCS | Mod: ORL | Performed by: INTERNAL MEDICINE

## 2024-10-01 PROCEDURE — 84443 ASSAY THYROID STIM HORMONE: CPT | Mod: ORL | Performed by: INTERNAL MEDICINE

## 2024-10-01 PROCEDURE — 85027 COMPLETE CBC AUTOMATED: CPT | Mod: ORL | Performed by: INTERNAL MEDICINE

## 2024-10-01 PROCEDURE — 82607 VITAMIN B-12: CPT | Mod: ORL | Performed by: INTERNAL MEDICINE

## 2024-10-01 PROCEDURE — 36415 COLL VENOUS BLD VENIPUNCTURE: CPT | Mod: ORL | Performed by: INTERNAL MEDICINE

## 2024-10-01 PROCEDURE — 80053 COMPREHEN METABOLIC PANEL: CPT | Mod: ORL | Performed by: INTERNAL MEDICINE

## 2025-03-07 ENCOUNTER — LAB REQUISITION (OUTPATIENT)
Dept: LAB | Facility: CLINIC | Age: OVER 89
End: 2025-03-07
Payer: COMMERCIAL

## 2025-03-07 DIAGNOSIS — R30.0 DYSURIA: ICD-10-CM

## 2025-03-07 LAB
ALBUMIN UR-MCNC: NEGATIVE MG/DL
APPEARANCE UR: CLEAR
BILIRUB UR QL STRIP: NEGATIVE
COLOR UR AUTO: ABNORMAL
GLUCOSE UR STRIP-MCNC: NEGATIVE MG/DL
HGB UR QL STRIP: NEGATIVE
KETONES UR STRIP-MCNC: NEGATIVE MG/DL
LEUKOCYTE ESTERASE UR QL STRIP: ABNORMAL
NITRATE UR QL: NEGATIVE
PH UR STRIP: 5.5 [PH] (ref 5–7)
RBC URINE: 0 /HPF
SP GR UR STRIP: 1.01 (ref 1–1.03)
SQUAMOUS EPITHELIAL: 1 /HPF
TRANSITIONAL EPI: <1 /HPF
UROBILINOGEN UR STRIP-MCNC: NORMAL MG/DL
WBC URINE: 3 /HPF

## 2025-03-07 PROCEDURE — 81001 URINALYSIS AUTO W/SCOPE: CPT | Mod: ORL | Performed by: INTERNAL MEDICINE

## 2025-03-07 PROCEDURE — 87086 URINE CULTURE/COLONY COUNT: CPT | Mod: ORL | Performed by: INTERNAL MEDICINE

## 2025-03-08 LAB — BACTERIA UR CULT: NORMAL

## 2025-03-26 ENCOUNTER — LAB REQUISITION (OUTPATIENT)
Dept: LAB | Facility: CLINIC | Age: OVER 89
End: 2025-03-26
Payer: COMMERCIAL

## 2025-03-26 DIAGNOSIS — I73.9 PERIPHERAL VASCULAR DISEASE, UNSPECIFIED: ICD-10-CM

## 2025-03-26 DIAGNOSIS — G63 POLYNEUROPATHY IN DISEASES CLASSIFIED ELSEWHERE: ICD-10-CM

## 2025-03-26 DIAGNOSIS — E03.9 HYPOTHYROIDISM, UNSPECIFIED: ICD-10-CM

## 2025-03-26 DIAGNOSIS — M17.12 UNILATERAL PRIMARY OSTEOARTHRITIS, LEFT KNEE: ICD-10-CM

## 2025-03-26 DIAGNOSIS — I50.9 HEART FAILURE, UNSPECIFIED (H): ICD-10-CM

## 2025-04-01 LAB
ALBUMIN SERPL BCG-MCNC: 4 G/DL (ref 3.5–5.2)
ALP SERPL-CCNC: 134 U/L (ref 40–150)
ALT SERPL W P-5'-P-CCNC: 70 U/L (ref 0–50)
ANION GAP SERPL CALCULATED.3IONS-SCNC: 14 MMOL/L (ref 7–15)
AST SERPL W P-5'-P-CCNC: 93 U/L (ref 0–45)
BILIRUB SERPL-MCNC: 0.4 MG/DL
BUN SERPL-MCNC: 38.3 MG/DL (ref 8–23)
CALCIUM SERPL-MCNC: 10.4 MG/DL (ref 8.8–10.4)
CHLORIDE SERPL-SCNC: 106 MMOL/L (ref 98–107)
CREAT SERPL-MCNC: 1.22 MG/DL (ref 0.51–0.95)
EGFRCR SERPLBLD CKD-EPI 2021: 42 ML/MIN/1.73M2
ERYTHROCYTE [DISTWIDTH] IN BLOOD BY AUTOMATED COUNT: 16.1 % (ref 10–15)
GLUCOSE SERPL-MCNC: 126 MG/DL (ref 70–99)
HCO3 SERPL-SCNC: 23 MMOL/L (ref 22–29)
HCT VFR BLD AUTO: 44.2 % (ref 35–47)
HGB BLD-MCNC: 13.5 G/DL (ref 11.7–15.7)
MCH RBC QN AUTO: 30.7 PG (ref 26.5–33)
MCHC RBC AUTO-ENTMCNC: 30.5 G/DL (ref 31.5–36.5)
MCV RBC AUTO: 101 FL (ref 78–100)
PLATELET # BLD AUTO: 232 10E3/UL (ref 150–450)
POTASSIUM SERPL-SCNC: 4.6 MMOL/L (ref 3.4–5.3)
PROT SERPL-MCNC: 7.8 G/DL (ref 6.4–8.3)
RBC # BLD AUTO: 4.4 10E6/UL (ref 3.8–5.2)
SODIUM SERPL-SCNC: 143 MMOL/L (ref 135–145)
TSH SERPL DL<=0.005 MIU/L-ACNC: 14 UIU/ML (ref 0.3–4.2)
VIT B12 SERPL-MCNC: 380 PG/ML (ref 232–1245)
WBC # BLD AUTO: 8 10E3/UL (ref 4–11)

## 2025-04-01 PROCEDURE — 36415 COLL VENOUS BLD VENIPUNCTURE: CPT | Mod: ORL | Performed by: INTERNAL MEDICINE

## 2025-04-01 PROCEDURE — 82607 VITAMIN B-12: CPT | Mod: ORL | Performed by: INTERNAL MEDICINE

## 2025-04-01 PROCEDURE — 85027 COMPLETE CBC AUTOMATED: CPT | Mod: ORL | Performed by: INTERNAL MEDICINE

## 2025-04-01 PROCEDURE — 80053 COMPREHEN METABOLIC PANEL: CPT | Mod: ORL | Performed by: INTERNAL MEDICINE

## 2025-04-01 PROCEDURE — 84443 ASSAY THYROID STIM HORMONE: CPT | Mod: ORL | Performed by: INTERNAL MEDICINE

## 2025-04-01 PROCEDURE — P9604 ONE-WAY ALLOW PRORATED TRIP: HCPCS | Mod: ORL | Performed by: INTERNAL MEDICINE

## 2025-04-03 ENCOUNTER — LAB REQUISITION (OUTPATIENT)
Dept: LAB | Facility: CLINIC | Age: OVER 89
End: 2025-04-03
Payer: COMMERCIAL

## 2025-04-03 DIAGNOSIS — R30.0 DYSURIA: ICD-10-CM

## 2025-04-03 LAB
ALBUMIN UR-MCNC: NEGATIVE MG/DL
APPEARANCE UR: CLEAR
BILIRUB UR QL STRIP: NEGATIVE
COLOR UR AUTO: ABNORMAL
GLUCOSE UR STRIP-MCNC: NEGATIVE MG/DL
HGB UR QL STRIP: NEGATIVE
HYALINE CASTS: 27 /LPF
KETONES UR STRIP-MCNC: NEGATIVE MG/DL
LEUKOCYTE ESTERASE UR QL STRIP: ABNORMAL
MUCOUS THREADS #/AREA URNS LPF: PRESENT /LPF
NITRATE UR QL: NEGATIVE
PH UR STRIP: 5.5 [PH] (ref 5–7)
RBC URINE: 1 /HPF
SP GR UR STRIP: 1.02 (ref 1–1.03)
SQUAMOUS EPITHELIAL: 1 /HPF
UROBILINOGEN UR STRIP-MCNC: NORMAL MG/DL
WBC URINE: 5 /HPF

## 2025-04-03 PROCEDURE — 81001 URINALYSIS AUTO W/SCOPE: CPT | Mod: ORL | Performed by: INTERNAL MEDICINE

## 2025-04-03 PROCEDURE — 87086 URINE CULTURE/COLONY COUNT: CPT | Mod: ORL | Performed by: INTERNAL MEDICINE

## 2025-04-05 LAB — BACTERIA UR CULT: NORMAL
